# Patient Record
Sex: FEMALE | Race: BLACK OR AFRICAN AMERICAN | NOT HISPANIC OR LATINO | Employment: UNEMPLOYED | URBAN - METROPOLITAN AREA
[De-identification: names, ages, dates, MRNs, and addresses within clinical notes are randomized per-mention and may not be internally consistent; named-entity substitution may affect disease eponyms.]

---

## 2023-04-08 ENCOUNTER — HOSPITAL ENCOUNTER (EMERGENCY)
Facility: HOSPITAL | Age: 68
Discharge: HOME | End: 2023-04-08
Attending: EMERGENCY MEDICINE
Payer: MEDICARE

## 2023-04-08 VITALS
HEIGHT: 64 IN | BODY MASS INDEX: 44.39 KG/M2 | SYSTOLIC BLOOD PRESSURE: 150 MMHG | TEMPERATURE: 99 F | DIASTOLIC BLOOD PRESSURE: 75 MMHG | WEIGHT: 260 LBS | OXYGEN SATURATION: 96 % | RESPIRATION RATE: 15 BRPM | HEART RATE: 79 BPM

## 2023-04-08 DIAGNOSIS — S99.911A ANKLE INJURY, RIGHT, INITIAL ENCOUNTER: Primary | ICD-10-CM

## 2023-04-08 PROCEDURE — 63600175 PHARM REV CODE 636 W HCPCS: Performed by: PHYSICIAN ASSISTANT

## 2023-04-08 PROCEDURE — 99284 EMERGENCY DEPT VISIT MOD MDM: CPT

## 2023-04-08 PROCEDURE — 96372 THER/PROPH/DIAG INJ SC/IM: CPT | Performed by: PHYSICIAN ASSISTANT

## 2023-04-08 RX ORDER — KETOROLAC TROMETHAMINE 30 MG/ML
10 INJECTION, SOLUTION INTRAMUSCULAR; INTRAVENOUS
Status: COMPLETED | OUTPATIENT
Start: 2023-04-08 | End: 2023-04-08

## 2023-04-08 RX ORDER — TRIAMTERENE AND HYDROCHLOROTHIAZIDE 37.5; 25 MG/1; MG/1
1 CAPSULE ORAL EVERY MORNING
COMMUNITY

## 2023-04-08 RX ADMIN — KETOROLAC TROMETHAMINE 10 MG: 30 INJECTION, SOLUTION INTRAMUSCULAR; INTRAVENOUS at 01:04

## 2023-04-08 NOTE — DISCHARGE INSTRUCTIONS
Please ice your ankle 3 times a day for the next few days.  Please keep the Ace wrap applied to the ankle for compression and elevate your ankle as much as you can at home.  You may take over-the-counter Tylenol or ibuprofen as needed for pain.  Please schedule a follow up appointment with your primary doctor within the next few days to re-evaluate your symptoms.

## 2023-04-08 NOTE — ED PROVIDER NOTES
Encounter Date: 4/8/2023    SCRIBE #1 NOTE: I, Aye gM, am scribing for, and in the presence of,  Angelic Lindsay PA-C. I have scribed the following portions of the note - Other sections scribed: HPI, NAHUN.     History     Chief Complaint   Patient presents with    Ankle Pain     Pt c/o right ankle pain after she sprained it while running after a ball 4 days ago. Pt denies fall or trauma. Only manual BP on pt, per pt.      Blenda Ryles is a 66 y/o female with PMHx of HTN, who presents to the ED with R ankle pain onset 4 days ago. Pt states she was trying to  a ball when she twisted the R ankle outwards on a rock. Pt reports reduced ambulation d/t worsening pain. Pt reports taking Advil with no relief; denies taking any OTC medications PTA. Denies any Hx of stroke, MI, and kidney disease. Allergies include Demerol and Percocet. Denies any pain in the R knee, R upper leg and any other symptoms.     The history is provided by the patient. No  was used.   Review of patient's allergies indicates:   Allergen Reactions    Demerol [meperidine] Blisters    Percocet [oxycodone-acetaminophen] Nausea And Vomiting     Past Medical History:   Diagnosis Date    Cancer     Breast (right)    Hypertension      Past Surgical History:   Procedure Laterality Date    MASTECTOMY Right     SHOULDER SURGERY Right      History reviewed. No pertinent family history.  Social History     Tobacco Use    Smoking status: Never    Smokeless tobacco: Never   Substance Use Topics    Alcohol use: Never    Drug use: Never     Review of Systems   Constitutional:  Negative for fever.   HENT:  Negative for congestion, sinus pressure, sinus pain, sneezing and sore throat.    Respiratory:  Negative for cough and shortness of breath.    Cardiovascular:  Negative for chest pain.   Gastrointestinal:  Negative for abdominal pain, nausea and vomiting.   Genitourinary:  Negative for difficulty urinating and dysuria.    Musculoskeletal:  Positive for arthralgias. Negative for back pain and myalgias.   Skin:  Negative for rash.   Neurological:  Negative for weakness.   Hematological:  Does not bruise/bleed easily.     Physical Exam     Initial Vitals [04/08/23 1127]   BP Pulse Resp Temp SpO2   (!) 150/75 79 15 98.5 °F (36.9 °C) 96 %      MAP       --         Physical Exam    Nursing note and vitals reviewed.  Constitutional: She appears well-developed and well-nourished. She is not diaphoretic. No distress.   HENT:   Head: Normocephalic and atraumatic.   Right Ear: External ear normal.   Left Ear: External ear normal.   Eyes: Conjunctivae and EOM are normal.   Neck:   Normal range of motion.  Cardiovascular:  Normal rate, regular rhythm and intact distal pulses.           Pulmonary/Chest: Breath sounds normal. No respiratory distress.   Abdominal: Abdomen is soft. Bowel sounds are normal. She exhibits no distension. There is no abdominal tenderness.   Musculoskeletal:      Cervical back: Normal range of motion.      Right knee: Normal.      Left knee: Normal.      Right lower leg: Normal.      Left lower leg: Normal.      Right ankle: Swelling present. No deformity, ecchymosis or lacerations. Tenderness present over the medial malleolus. Decreased range of motion. Normal pulse.      Right Achilles Tendon: Travis's test negative.      Left ankle: Normal.      Right foot: Normal range of motion and normal capillary refill. No swelling, deformity, tenderness or bony tenderness. Normal pulse.      Left foot: Normal.     Neurological: She is alert and oriented to person, place, and time. GCS score is 15. GCS eye subscore is 4. GCS verbal subscore is 5. GCS motor subscore is 6.   Skin: Skin is warm and dry.   Psychiatric: She has a normal mood and affect. Her behavior is normal.       ED Course   Procedures  Labs Reviewed - No data to display       Imaging Results              X-Ray Ankle Complete Right (Final result)  Result time  04/08/23 12:48:41      Final result by Cecy Noriega MD (04/08/23 12:48:41)                   Impression:      As above.      Electronically signed by: Cecy Noriega MD  Date:    04/08/2023  Time:    12:48               Narrative:    EXAMINATION:  XR ANKLE COMPLETE 3 VIEW RIGHT    CLINICAL HISTORY:  Unspecified injury of right ankle, initial encounter    TECHNIQUE:  AP, lateral, and oblique images of the right ankle were performed.    COMPARISON:  None    FINDINGS:  No acute fracture or bony destructive process is seen.  Alignment is preserved.  There are calcaneal spurs.  Rounded calcifications in the soft tissues may represent phleboliths.                                       Medications   ketorolac injection 9.999 mg (9.999 mg Intramuscular Given 4/8/23 1314)     Medical Decision Making:   Differential Diagnosis:   Fracture, dislocation, sprain  Clinical Tests:   Radiological Study: Ordered and Reviewed  ED Management:  67-year-old female with past medical history of hypertension presents to the ED today for evaluation of ankle injury on her right ankle.  Reports a few days ago she twisted her ankle a feels like she sprained it.  Reports her symptoms of pain and swelling in her ankle have progressively gotten worse over the past few days she was having pain when trying to ambulate on the foot.  Denies any other symptoms aside from pain and swelling in the right ankle.  She did not injure her right knee ankle during the fall.  Denies hearing a pop during the incident.  On physical exam she has some swelling and tenderness over the medial malleolus of the right ankle.  Travis test negative.  X-ray of the right ankle shows no fracture or dislocation.  She was given an injection of Toradol for pain in her ankle was wrapped with Ace wrap and ice applied to the ankle. I recommend she ice and elevate her ankle at home and she may take over the counter tylenol as needed for pain.  I recommend she follow up  with her primary care doctor within to re-evaluate her symptoms.        Scribe Attestation:   Scribe #1: I performed the above scribed service and the documentation accurately describes the services I performed. I attest to the accuracy of the note.                   Clinical Impression:   Final diagnoses:  [H80.253B] Ankle injury, right, initial encounter (Primary)     Scribe attestation: I, Angelic Lindsay, personally performed the services described in this documentation. All medical record entries made by the scribe were at my direction and in my presence.  I have reviewed the chart and agree that the record reflects my personal performance and is accurate and complete.    ED Disposition Condition    Discharge Stable          ED Prescriptions    None       Follow-up Information       Follow up With Specialties Details Why Contact Info    [facility] - Emergency Dept Emergency Medicine Go to  As needed, If symptoms worsen [id number]  [facility] [id number]  [phone]             Angelic Lindsay PA-C  04/08/23 3156

## 2023-04-08 NOTE — ED TRIAGE NOTES
Patient with complaints of ankle pain.     Reports right ankle pain after stooping down to  a ball and twisted ankle x's 3-4 days ago. Denies falling.